# Patient Record
Sex: FEMALE | Race: WHITE | URBAN - METROPOLITAN AREA
[De-identification: names, ages, dates, MRNs, and addresses within clinical notes are randomized per-mention and may not be internally consistent; named-entity substitution may affect disease eponyms.]

---

## 2019-02-27 ENCOUNTER — APPOINTMENT (RX ONLY)
Dept: URBAN - METROPOLITAN AREA CLINIC 166 | Facility: CLINIC | Age: 79
Setting detail: DERMATOLOGY
End: 2019-02-27

## 2019-02-27 DIAGNOSIS — L57.8 OTHER SKIN CHANGES DUE TO CHRONIC EXPOSURE TO NONIONIZING RADIATION: ICD-10-CM

## 2019-02-27 DIAGNOSIS — Z41.9 ENCOUNTER FOR PROCEDURE FOR PURPOSES OTHER THAN REMEDYING HEALTH STATE, UNSPECIFIED: ICD-10-CM

## 2019-02-27 DIAGNOSIS — B00.1 HERPESVIRAL VESICULAR DERMATITIS: ICD-10-CM

## 2019-02-27 PROCEDURE — ? COSMETIC CONSULTATION: FILLERS

## 2019-02-27 PROCEDURE — ? TREATMENT REGIMEN

## 2019-02-27 PROCEDURE — ? PATIENT SPECIFIC COUNSELING

## 2019-02-27 PROCEDURE — ? PRESCRIPTION

## 2019-02-27 PROCEDURE — ? BOTOX

## 2019-02-27 PROCEDURE — ? COUNSELING

## 2019-02-27 RX ORDER — LIDOCAINE AND PRILOCAINE 25; 25 MG/G; MG/G
CREAM TOPICAL
Qty: 1 | Refills: 2 | Status: ERX | COMMUNITY
Start: 2019-02-27

## 2019-02-27 RX ORDER — ACYCLOVIR 800 MG/1
TABLET ORAL
Qty: 60 | Refills: 2 | Status: ERX | COMMUNITY
Start: 2019-02-27

## 2019-02-27 RX ADMIN — ACYCLOVIR: 800 TABLET ORAL at 00:00

## 2019-02-27 RX ADMIN — LIDOCAINE AND PRILOCAINE: 25; 25 CREAM TOPICAL at 00:00

## 2019-02-27 ASSESSMENT — LOCATION SIMPLE DESCRIPTION DERM
LOCATION SIMPLE: RIGHT CHEEK
LOCATION SIMPLE: LEFT CHEEK

## 2019-02-27 ASSESSMENT — LOCATION DETAILED DESCRIPTION DERM
LOCATION DETAILED: RIGHT CENTRAL MALAR CHEEK
LOCATION DETAILED: LEFT CENTRAL MALAR CHEEK

## 2019-02-27 ASSESSMENT — LOCATION ZONE DERM: LOCATION ZONE: FACE

## 2019-02-27 NOTE — PROCEDURE: BOTOX
Glabellar Complex Units: 0
Price (Use Numbers Only, No Special Characters Or $): 98
Dilution (U/0.1 Cc): 1
Additional Area 1 Units: 6.5
Post-Care Instructions: Patient instructed to not lie down for 4 hours and limit physical activity for 24 hours. Patient instructed not to travel by airplane for 48 hours.
Additional Area 1 Location: Face
Consent: Written consent obtained. Risks include but not limited to lid/brow ptosis, bruising, swelling, diplopia, temporary effect, incomplete chemical denervation.
Expiration Date (Month Year): 5/21
Detail Level: Zone
Lot #: [id number]

## 2019-02-27 NOTE — PROCEDURE: PATIENT SPECIFIC COUNSELING
Detail Level: Zone
Filler visit #1\\n1-2 Voluma , cheeks ($975 per)\\n1 Restylane Refine, lower face/lips/mouth corners  ($950)\\nTotal $2400- $1725 , price includes multiple syringe discount \\n\\nFiller visit #2 \\n1/2 Belotero , fine lines ($500) +/- Volbella ($550) $100 discount if both same day\\n\\nPre and post handout given \\nProduct handout given

## 2019-02-27 NOTE — PROCEDURE: TREATMENT REGIMEN
Detail Level: Simple
Initiate Treatment: Avene 0.1% cream , apply a pea sized drop to the face once a day as tolerated, start 2-3 days a week, increasing to nightly \\n.
Samples Given: Elta MD 40\\nNia Intensive Revovery\\n.
Discontinue Regimen: Zo products \\n.

## 2019-03-08 ENCOUNTER — APPOINTMENT (RX ONLY)
Dept: URBAN - METROPOLITAN AREA CLINIC 166 | Facility: CLINIC | Age: 79
Setting detail: DERMATOLOGY
End: 2019-03-08

## 2019-03-08 DIAGNOSIS — Z41.9 ENCOUNTER FOR PROCEDURE FOR PURPOSES OTHER THAN REMEDYING HEALTH STATE, UNSPECIFIED: ICD-10-CM

## 2019-03-08 PROCEDURE — ? FILLERS

## 2019-03-08 ASSESSMENT — LOCATION SIMPLE DESCRIPTION DERM: LOCATION SIMPLE: RIGHT CHEEK

## 2019-03-08 ASSESSMENT — LOCATION DETAILED DESCRIPTION DERM: LOCATION DETAILED: RIGHT CENTRAL MALAR CHEEK

## 2019-03-08 ASSESSMENT — LOCATION ZONE DERM: LOCATION ZONE: FACE

## 2019-03-29 ENCOUNTER — APPOINTMENT (RX ONLY)
Dept: URBAN - METROPOLITAN AREA CLINIC 166 | Facility: CLINIC | Age: 79
Setting detail: DERMATOLOGY
End: 2019-03-29

## 2019-03-29 DIAGNOSIS — H01.13 ECZEMATOUS DERMATITIS OF EYELID: ICD-10-CM

## 2019-03-29 DIAGNOSIS — Z41.9 ENCOUNTER FOR PROCEDURE FOR PURPOSES OTHER THAN REMEDYING HEALTH STATE, UNSPECIFIED: ICD-10-CM

## 2019-03-29 PROBLEM — H01.134 ECZEMATOUS DERMATITIS OF LEFT UPPER EYELID: Status: ACTIVE | Noted: 2019-03-29

## 2019-03-29 PROBLEM — H01.131 ECZEMATOUS DERMATITIS OF RIGHT UPPER EYELID: Status: ACTIVE | Noted: 2019-03-29

## 2019-03-29 PROCEDURE — ? TREATMENT REGIMEN

## 2019-03-29 PROCEDURE — ? COUNSELING

## 2019-03-29 PROCEDURE — ? FILLERS

## 2019-03-29 ASSESSMENT — LOCATION DETAILED DESCRIPTION DERM
LOCATION DETAILED: LEFT LATERAL SUPERIOR EYELID
LOCATION DETAILED: LEFT MEDIAL SUPERIOR EYELID
LOCATION DETAILED: RIGHT LATERAL SUPERIOR EYELID
LOCATION DETAILED: RIGHT MEDIAL SUPERIOR EYELID

## 2019-03-29 ASSESSMENT — LOCATION SIMPLE DESCRIPTION DERM
LOCATION SIMPLE: RIGHT SUPERIOR EYELID
LOCATION SIMPLE: LEFT SUPERIOR EYELID

## 2019-03-29 ASSESSMENT — LOCATION ZONE DERM: LOCATION ZONE: EYELID

## 2019-03-29 NOTE — PROCEDURE: FILLERS
Tear Troughs Filler  Volume In Cc: 0
Include Cannula Length?: 1.5 inch
Anesthesia Type: 1% lidocaine with epinephrine and a 1:10 solution of 8.4% sodium bicarbonate
Include Cannula Information In Note?: No
Anesthesia Volume In Cc: 0.3
Include Cannula Size?: 25G
Additional Area 1 Location: Face *[facility]
Additional Area 1 Location: Face
Additional Area 1 Volume In Cc: 0.5
Topical Anesthesia?: EMLA
Filler: Juvederm Voluma XC
Detail Level: Zone
Filler: Belotero
Lot #: [id number]
Price (Use Numbers Only, No Special Characters Or $): 900
Expiration Date (Month Year): 4/11/20
Include Cannula Information In Note?: Yes
Lot #: [id number]
Map Statment: See Attach Map for Complete Details
Consent: Written consent obtained. Risks include but not limited to bruising, beading, irregular texture, ulceration, infection, allergic reaction, scar formation, incomplete augmentation, temporary nature, procedural pain.
Post-Care Instructions: Patient instructed to apply ice to reduce swelling.
Expiration Date (Month Year): 2/12/20
Lot #: [id number] Mai Lawson M.D. SOLO***
Expiration Date (Month Year): 1/9/19

## 2019-03-29 NOTE — PROCEDURE: TREATMENT REGIMEN
Detail Level: Simple
Otc Regimen: Gentle cleanser \\n.
Samples Given: CeraVe cleanser\\nElidel Cream\\n.
Initiate Treatment: Elidel Cream as needed for rash and scaling \\n.
Samples Given: Elidel
Plan: Discontinue nail products and fragranced products, patient is to follow up with Deirdre Watkins M.D. for skin check\\nDiscussed future patch testing

## 2019-05-03 ENCOUNTER — APPOINTMENT (RX ONLY)
Dept: URBAN - METROPOLITAN AREA CLINIC 166 | Facility: CLINIC | Age: 79
Setting detail: DERMATOLOGY
End: 2019-05-03

## 2019-05-03 DIAGNOSIS — Z41.9 ENCOUNTER FOR PROCEDURE FOR PURPOSES OTHER THAN REMEDYING HEALTH STATE, UNSPECIFIED: ICD-10-CM

## 2019-05-03 DIAGNOSIS — L57.8 OTHER SKIN CHANGES DUE TO CHRONIC EXPOSURE TO NONIONIZING RADIATION: ICD-10-CM

## 2019-05-03 PROCEDURE — ? PRESCRIPTION

## 2019-05-03 PROCEDURE — ? BOTOX

## 2019-05-03 RX ORDER — PHARMACY COMPOUNDING ACCESSORY
EACH MISCELLANEOUS
Qty: 1 | Refills: 3 | Status: ERX | COMMUNITY
Start: 2019-05-03

## 2019-05-03 RX ADMIN — Medication: at 00:00

## 2019-05-03 NOTE — PROCEDURE: BOTOX
Lateral Platysmal Bands Units: 0
Dilution (U/0.1 Cc): 1
Expiration Date (Month Year): 9/21
Additional Area 1 Units: 32
Detail Level: Zone
Additional Area 6 Location: [facility]
Lot #: [id number]
Consent: Written consent obtained. Risks include but not limited to lid/brow ptosis, bruising, swelling, diplopia, temporary effect, incomplete chemical denervation.
Additional Area 1 Location: Face
Price (Use Numbers Only, No Special Characters Or $): 001
Post-Care Instructions: Patient instructed to not lie down for 4 hours and limit physical activity for 24 hours. Patient instructed not to travel by airplane for 48 hours.

## 2020-01-17 ENCOUNTER — APPOINTMENT (RX ONLY)
Dept: URBAN - METROPOLITAN AREA CLINIC 173 | Facility: CLINIC | Age: 80
Setting detail: DERMATOLOGY
End: 2020-01-17

## 2020-01-17 DIAGNOSIS — L57.8 OTHER SKIN CHANGES DUE TO CHRONIC EXPOSURE TO NONIONIZING RADIATION: ICD-10-CM

## 2020-01-17 DIAGNOSIS — Z41.9 ENCOUNTER FOR PROCEDURE FOR PURPOSES OTHER THAN REMEDYING HEALTH STATE, UNSPECIFIED: ICD-10-CM

## 2020-01-17 PROCEDURE — ? BOTOX

## 2020-01-17 PROCEDURE — ? OTHER (COSMETIC)

## 2020-01-17 PROCEDURE — ? PRESCRIPTION

## 2020-01-17 PROCEDURE — ? FILLERS

## 2020-01-17 NOTE — PROCEDURE: FILLERS
Lateral Face Filler  Volume In Cc: 0
Lot #: [id number]
Include Cannula Information In Note?: No
Expiration Date (Month Year): 3/29/21
Price (Use Numbers Only, No Special Characters Or $): 5945
Include Cannula Information In Note?: Yes
Expiration Date (Month Year): 8/19/20
Map Statment: See Attach Map for Complete Details
Include Cannula Size?: 25G
Lot #: [id number]
Anesthesia Type: 1% lidocaine with epinephrine and a 1:10 solution of 8.4% sodium bicarbonate
Include Cannula Length?: 1.5 inch
Expiration Date (Month Year): 08/31/2020
Additional Area 1 Location: Face
Consent: Written consent obtained. Risks include but not limited to bruising, beading, irregular texture, ulceration, infection, allergic reaction, scar formation, incomplete augmentation, temporary nature, procedural pain.
Topical Anesthesia?: EMLA
Post-Care Instructions: Patient instructed to apply ice to reduce swelling.
Anesthesia Volume In Cc: 0.6
Additional Area 1 Volume In Cc: 1
Filler: Juvederm Voluma XC
Filler: Juvederm Ultra XC
Detail Level: Zone
Lot #: [id number]

## 2020-01-17 NOTE — PROCEDURE: BOTOX
Show Levator Superior Units: Yes
Additional Area 3 Units: 0
Additional Area 1 Location: Face
Expiration Date (Month Year): 6/22
Price (Use Numbers Only, No Special Characters Or $): 310
Additional Area 1 Units: 42
Show Ucl Units: No
Consent: Written consent obtained. Risks include but not limited to lid/brow ptosis, bruising, swelling, diplopia, temporary effect, incomplete chemical denervation.
Lot #: [id number]
Dilution (U/0.1 Cc): 1
Post-Care Instructions: Patient instructed to not lie down for 4 hours and limit physical activity for 24 hours. Patient instructed not to travel by airplane for 48 hours.
Detail Level: Zone

## 2020-03-05 ENCOUNTER — RX ONLY (OUTPATIENT)
Age: 80
Setting detail: RX ONLY
End: 2020-03-05

## 2020-03-05 ENCOUNTER — APPOINTMENT (RX ONLY)
Dept: URBAN - METROPOLITAN AREA CLINIC 173 | Facility: CLINIC | Age: 80
Setting detail: DERMATOLOGY
End: 2020-03-05

## 2020-03-05 DIAGNOSIS — Z41.9 ENCOUNTER FOR PROCEDURE FOR PURPOSES OTHER THAN REMEDYING HEALTH STATE, UNSPECIFIED: ICD-10-CM

## 2020-03-05 PROCEDURE — ? SECRET RF

## 2020-03-05 ASSESSMENT — LOCATION SIMPLE DESCRIPTION DERM
LOCATION SIMPLE: RIGHT CHEEK
LOCATION SIMPLE: LEFT ANTERIOR NECK

## 2020-03-05 ASSESSMENT — LOCATION DETAILED DESCRIPTION DERM
LOCATION DETAILED: RIGHT CENTRAL MALAR CHEEK
LOCATION DETAILED: LEFT SUPERIOR LATERAL NECK

## 2020-03-05 ASSESSMENT — LOCATION ZONE DERM
LOCATION ZONE: FACE
LOCATION ZONE: NECK

## 2020-03-05 NOTE — PROCEDURE: SECRET RF
Passes: 0
Depth In Microns: 0.5
Additional Treatment Area: neck
Tip: 24-Insulated
Treatment Number: 1
Tip: 64-Insulated
Treatment Area: periorbital
Intensity (Include Units If Applicable): 5
Passes: 2
Additional Treatment Area: mid-lower face
Additional Treatment Area: forehead
Detail Level: Zone
Render Post-Care In The Note: Yes
Consent: Written consent obtained, risks reviewed including but not limited to darker or lighter pigmentary change, and/or incomplete improvement.
Rf Duration (Include Units If Applicable): 300ms
Intensity (Include Units If Applicable): 6
Use Distraction Techniques In Note: No
Rf Duration (Include Units If Applicable): 300
Post-Care Instructions: I reviewed with the patient in detail post-care instructions. Patient should apply moisturizer until fully healed.
Rf Duration (Include Units If Applicable): 200ms
Passes: 3
Additional Treatment Area: upper face
Indication: Wrinkles

## 2021-06-09 NOTE — PROCEDURE: FILLERS
Additional Area 1 Volume In Cc: 0
Expiration Date (Month Year): 2/28/20
Include Cannula Information In Note?: Yes
Anesthesia Type: 1% lidocaine with epinephrine and a 1:10 solution of 8.4% sodium bicarbonate
Anesthesia Volume In Cc: 1
Include Cannula Size?: 25G
Include Cannula Length?: 1.5 inch
Include Cannula Information In Note?: No
Lot #: [id number]
Additional Area 1 Location: Face
Topical Anesthesia?: EMLA
Lot #: [id number] Mai Lawson M.D. SOLO***
Expiration Date (Month Year): 1/9/19
Detail Level: Zone
Filler: Restylane Refyne
Price (Use Numbers Only, No Special Characters Or $): 7849
Filler: Juvederm Voluma XC
Lot #: [id number]
Consent: Written consent obtained. Risks include but not limited to bruising, beading, irregular texture, ulceration, infection, allergic reaction, scar formation, incomplete augmentation, temporary nature, procedural pain.
Map Statment: See Attach Map for Complete Details
There is 1 Wet Read(s) to document.
Post-Care Instructions: Patient instructed to apply ice to reduce swelling.

## 2022-03-16 ENCOUNTER — APPOINTMENT (RX ONLY)
Dept: URBAN - METROPOLITAN AREA CLINIC 173 | Facility: CLINIC | Age: 82
Setting detail: DERMATOLOGY
End: 2022-03-16

## 2022-03-16 ENCOUNTER — RX ONLY (OUTPATIENT)
Age: 82
Setting detail: RX ONLY
End: 2022-03-16

## 2022-03-16 DIAGNOSIS — B00.1 HERPESVIRAL VESICULAR DERMATITIS: ICD-10-CM

## 2022-03-16 DIAGNOSIS — Z41.9 ENCOUNTER FOR PROCEDURE FOR PURPOSES OTHER THAN REMEDYING HEALTH STATE, UNSPECIFIED: ICD-10-CM

## 2022-03-16 PROCEDURE — ? OTHER (COSMETIC)

## 2022-03-16 PROCEDURE — ? BOTOX

## 2022-03-16 PROCEDURE — ? FILLERS

## 2022-03-16 PROCEDURE — ? PRESCRIPTION

## 2022-03-16 RX ORDER — LIDOCAINE AND PRILOCAINE 25; 25 MG/G; MG/G
CREAM TOPICAL
Qty: 30 | Refills: 3 | Status: ERX

## 2022-03-16 RX ORDER — ACYCLOVIR 800 MG/1
TABLET ORAL
Qty: 60 | Refills: 1 | Status: CANCELLED

## 2022-03-16 ASSESSMENT — LOCATION SIMPLE DESCRIPTION DERM: LOCATION SIMPLE: LEFT CHEEK

## 2022-03-16 ASSESSMENT — LOCATION DETAILED DESCRIPTION DERM: LOCATION DETAILED: LEFT CENTRAL MALAR CHEEK

## 2022-03-16 ASSESSMENT — LOCATION ZONE DERM: LOCATION ZONE: FACE

## 2022-03-16 NOTE — PROCEDURE: BOTOX
Glabellar Complex Units: 0
Consent: Written consent obtained. Risks include but not limited to lid/brow ptosis, bruising, swelling, diplopia, temporary effect, incomplete chemical denervation.
Show Additional Area 6: Yes
Show Ucl Units: No
Additional Area 1 Location: Face
Post-Care Instructions: Patient instructed to not lie down for 4 hours and limit physical activity for 24 hours. Patient instructed not to travel by airplane for 48 hours.
Additional Area 2 Location: Upper cutaneous lip
Detail Level: Zone
Additional Area 1 Units: 41
Lot #: [id number]
Additional Area 3 Location: masseters
Price (Use Numbers Only, No Special Characters Or $): 042
Expiration Date (Month Year): 5/24
Dilution (U/0.1 Cc): 1

## 2022-03-16 NOTE — PROCEDURE: OTHER (COSMETIC)
Detail Level: Zone
Other (Free Text): Discussed Radiofrequency Micro-needling (Vivace) to improve the texture and fine lines around lower face perioral area. Radiofrequency Microneedling is aimed at stimulating the body's own collagen production to tighten, lift and rejuvenate the skin. It can also reduce the appearance of fine lines and wrinkles, minimize pores, stretch marks and scarring.\\n\\n$800/treatment for full face. $400/treatment for focused areas. Handout provided and reviewed.

## 2022-03-16 NOTE — PROCEDURE: FILLERS
Additional Area 2 Volume In Cc: 0
Include Cannula Information In Note?: No
Filler: RHA 3
Include Cannula Information In Note?: Yes
Include Cannula Size?: 25G
Vermilion Lips Filler Volume In Cc: 0.5
Include Cannula Length?: 1.5 inch
Anesthesia Type: 1% lidocaine with epinephrine and a 1:10 solution of 8.4% sodium bicarbonate
Additional Area 1 Location: face
Anesthesia Volume In Cc: 0.3
Lot #: [id number]
Expiration Date (Month Year): 7/12/24
Topical Anesthesia?: 2.5% lidocaine, 2.5% prilocaine
Consent: Written consent obtained. Risks include but not limited to bruising, beading, irregular texture, ulceration, infection, allergic reaction, scar formation, incomplete augmentation, temporary nature, procedural pain.
Detail Level: Zone
Additional Area 2 Location: Hands
Post-Care Instructions: Patient instructed to apply ice to reduce swelling.
Filler: RHA 2
Price (Use Numbers Only, No Special Characters Or $): 1195
Additional Area 3 Location: chin
Lot #: [id number]
Lot #: [id number]
Additional Area 1 Volume In Cc: 1
Map Statment: See Attach Map for Complete Details
Additional Area 2 Location: ear lobes
Expiration Date (Month Year): 8/23/24
Expiration Date (Month Year): 08/31/2020

## 2022-04-01 ENCOUNTER — APPOINTMENT (RX ONLY)
Dept: URBAN - METROPOLITAN AREA CLINIC 173 | Facility: CLINIC | Age: 82
Setting detail: DERMATOLOGY
End: 2022-04-01

## 2022-04-01 DIAGNOSIS — Z41.9 ENCOUNTER FOR PROCEDURE FOR PURPOSES OTHER THAN REMEDYING HEALTH STATE, UNSPECIFIED: ICD-10-CM

## 2022-04-01 PROCEDURE — ? TREATMENT REGIMEN

## 2022-04-01 PROCEDURE — ? OTHER (COSMETIC)

## 2022-04-01 NOTE — PROCEDURE: OTHER (COSMETIC)
Detail Level: Simple
Other (Free Text): Discussed Vivace Beth-Oral Plus quoted at $400 x 5 treatments 4-6 weeks apart.\\n* patient advised to start the Alastin Nector 18 days prior BID and 1 week post.\\n- Patient given a pre and post handout .
Other (Free Text): Patient is happy with Botox will return in December for Filler and Botox at that time.

## 2022-04-05 ENCOUNTER — RX ONLY (OUTPATIENT)
Age: 82
Setting detail: RX ONLY
End: 2022-04-05

## 2022-04-05 RX ORDER — PHARMACY COMPOUNDING ACCESSORY
EACH MISCELLANEOUS
Qty: 30 | Refills: 3 | Status: ERX | COMMUNITY
Start: 2022-04-05

## 2022-04-27 ENCOUNTER — APPOINTMENT (RX ONLY)
Dept: URBAN - METROPOLITAN AREA CLINIC 173 | Facility: CLINIC | Age: 82
Setting detail: DERMATOLOGY
End: 2022-04-27

## 2022-04-27 DIAGNOSIS — Z41.9 ENCOUNTER FOR PROCEDURE FOR PURPOSES OTHER THAN REMEDYING HEALTH STATE, UNSPECIFIED: ICD-10-CM

## 2022-04-27 PROCEDURE — ? VIVACE

## 2022-04-27 ASSESSMENT — LOCATION SIMPLE DESCRIPTION DERM: LOCATION SIMPLE: RIGHT CHEEK

## 2022-04-27 ASSESSMENT — LOCATION ZONE DERM: LOCATION ZONE: FACE

## 2022-04-27 ASSESSMENT — LOCATION DETAILED DESCRIPTION DERM: LOCATION DETAILED: RIGHT CENTRAL MALAR CHEEK

## 2022-04-27 NOTE — PROCEDURE: VIVACE
Depth In Mm: 1.5
Pre-Procedure Text: After consent was obtained the treatment areas were treated using the above parameters.
Treatment Number (Optional): 1
Rf (Optional): 5/500,4/600,4/600
Location #1: perioral
Detail Level: Zone
Post-Care Instructions: After the procedure, take precautions agains sun exposure. Do not apply sunscreen for 12 hours after the procedure. Do not apply make-up for 12 hours after the procedure. Avoid alcohol based toners for 10-14 days. After 2-3 days patients can return to their regular skin regimen.
Depth In Mm: 0.5
Price (Use Numbers Only, No Special Characters Or $): 813
Depth In Mm: 1.3
Consent: Written consent obtained, risks reviewed including but not limited to pain, scarring, infection and incomplete improvement.  Patient understands the procedure is cosmetic in nature and will require out of pocket payment.

## 2022-04-29 ENCOUNTER — RX ONLY (OUTPATIENT)
Age: 82
Setting detail: RX ONLY
End: 2022-04-29

## 2022-04-29 RX ORDER — ACYCLOVIR 800 MG/1
TABLET ORAL
Qty: 60 | Refills: 1 | Status: ERX

## 2023-04-10 ENCOUNTER — RX ONLY (OUTPATIENT)
Age: 83
Setting detail: RX ONLY
End: 2023-04-10

## 2023-04-10 ENCOUNTER — APPOINTMENT (RX ONLY)
Dept: URBAN - METROPOLITAN AREA CLINIC 173 | Facility: CLINIC | Age: 83
Setting detail: DERMATOLOGY
End: 2023-04-10

## 2023-04-10 DIAGNOSIS — Z41.9 ENCOUNTER FOR PROCEDURE FOR PURPOSES OTHER THAN REMEDYING HEALTH STATE, UNSPECIFIED: ICD-10-CM

## 2023-04-10 DIAGNOSIS — L57.8 OTHER SKIN CHANGES DUE TO CHRONIC EXPOSURE TO NONIONIZING RADIATION: ICD-10-CM

## 2023-04-10 PROCEDURE — ? OTHER (COSMETIC)

## 2023-04-10 PROCEDURE — ? PRESCRIPTION

## 2023-04-10 PROCEDURE — ? FILLERS

## 2023-04-10 PROCEDURE — ? BOTOX

## 2023-04-10 RX ORDER — PHARMACY COMPOUNDING ACCESSORY
EACH MISCELLANEOUS
Qty: 30 | Refills: 3 | Status: ERX

## 2023-04-10 RX ORDER — LIDOCAINE AND PRILOCAINE 25; 25 MG/G; MG/G
CREAM TOPICAL
Qty: 30 | Refills: 3 | Status: ERX

## 2023-04-10 NOTE — PROCEDURE: BOTOX
Glabellar Complex Units: 0
Show Periorbital Units: Yes
Price (Use Numbers Only, No Special Characters Or $): 537
Additional Area 2 Location: Upper cutaneous lip
Show Ucl Units: No
Consent: Written consent obtained. Risks include but not limited to lid/brow ptosis, bruising, swelling, diplopia, temporary effect, incomplete chemical denervation.
Post-Care Instructions: Patient instructed to not lie down for 4 hours and limit physical activity for 24 hours. Patient instructed not to travel by airplane for 48 hours.
Lot #: [id number]
Additional Area 1 Location: Face
Dilution (U/0.1 Cc): 1
Additional Area 1 Units: 43
Incrementing Botox Units: By 0.5 Units
Additional Area 3 Location: masseters
Detail Level: Zone
Expiration Date (Month Year): 10/25

## 2023-04-10 NOTE — PROCEDURE: OTHER (COSMETIC)
Other (Free Text): Discussed the use of Hydrocolloid bandages at bedtime to smoothen skin on areas that are showing increased wrinkle depth after sleeping.
Detail Level: Zone

## 2023-04-10 NOTE — PROCEDURE: FILLERS
Aspiration Statement: Aspiration was performed prior to injecting site with filler.
Cheeks Filler Volume In Cc: 0
Additional Area 1 Location: face
Consent: Written consent obtained. Risks include but not limited to bruising, beading, irregular texture, ulceration, infection, allergic reaction, scar formation, incomplete augmentation, temporary nature, procedural pain.
Lot #: [id number]
Post-Care Instructions: Patient instructed to apply ice to reduce swelling.
Include Cannula Size?: 25G
Vermilion Lips Filler Volume In Cc: 0.3
Filler: Restylane Contour
Expiration Date (Month Year): 11/12/25
Anesthesia Type: 2% lidocaine with epinephrine and a 1:10 solution of 8.4% sodium bicarbonate
Include Cannula Information In Note?: Yes
Lot #: [id number]
Include Cannula Length?: 1.5 inch
Cheeks Filler Volume In Cc: 1
Expiration Date (Month Year): 6/21/25
Include Cannula Information In Note?: No
Topical Anesthesia?: 2.5% lidocaine, 2.5% prilocaine
Detail Level: Zone
Additional Area 2 Location: Hands
Price (Use Numbers Only, No Special Characters Or $): 9811
Additional Area 1 Volume In Cc: 0.7
Filler: RHA Redensity
Additional Area 3 Location: chin
Additional Area 2 Location: ear lobes
Lot #: [id number]
Map Statment: See Attach Map for Complete Details
Filler: RHA 2
Expiration Date (Month Year): 10/31/23

## 2024-01-16 ENCOUNTER — OFFICE VISIT (OUTPATIENT)
Facility: LOCATION | Age: 84
End: 2024-01-16
Payer: MEDICARE

## 2024-01-16 DIAGNOSIS — H52.13 MYOPIA, BILATERAL: ICD-10-CM

## 2024-01-16 DIAGNOSIS — Z96.1 PRESENCE OF INTRAOCULAR LENS: ICD-10-CM

## 2024-01-16 DIAGNOSIS — H40.013 OPEN ANGLE WITH BORDERLINE FINDINGS, LOW RISK, BILATERAL: Primary | ICD-10-CM

## 2024-01-16 PROCEDURE — 92014 COMPRE OPH EXAM EST PT 1/>: CPT | Performed by: OPHTHALMOLOGY

## 2024-01-16 PROCEDURE — 92133 CPTRZD OPH DX IMG PST SGM ON: CPT | Performed by: OPHTHALMOLOGY

## 2024-01-16 ASSESSMENT — INTRAOCULAR PRESSURE
OS: 16
OD: 16

## 2024-02-12 ENCOUNTER — RX ONLY (OUTPATIENT)
Age: 84
Setting detail: RX ONLY
End: 2024-02-12

## 2024-02-12 RX ORDER — LIDOCAINE AND PRILOCAINE 25; 25 MG/G; MG/G
CREAM TOPICAL
Qty: 30 | Refills: 3 | Status: ERX

## 2024-02-14 ENCOUNTER — APPOINTMENT (RX ONLY)
Dept: URBAN - METROPOLITAN AREA CLINIC 173 | Facility: CLINIC | Age: 84
Setting detail: DERMATOLOGY
End: 2024-02-14

## 2024-02-14 DIAGNOSIS — L98.8 OTHER SPECIFIED DISORDERS OF THE SKIN AND SUBCUTANEOUS TISSUE: ICD-10-CM

## 2024-02-14 DIAGNOSIS — Z41.9 ENCOUNTER FOR PROCEDURE FOR PURPOSES OTHER THAN REMEDYING HEALTH STATE, UNSPECIFIED: ICD-10-CM

## 2024-02-14 PROCEDURE — ? FILLERS

## 2024-02-14 PROCEDURE — ? COUNSELING

## 2024-02-14 PROCEDURE — ? BOTOX

## 2024-02-14 ASSESSMENT — LOCATION DETAILED DESCRIPTION DERM: LOCATION DETAILED: RIGHT INFERIOR VERMILION LIP

## 2024-02-14 ASSESSMENT — LOCATION ZONE DERM: LOCATION ZONE: LIP

## 2024-02-14 ASSESSMENT — LOCATION SIMPLE DESCRIPTION DERM: LOCATION SIMPLE: RIGHT LIP

## 2024-02-14 NOTE — PROCEDURE: BOTOX
Mentalis Units: 0
Additional Area 3 Location: masseters
Expiration Date (Month Year): 4/26
Show Nasal Units: Yes
Price (Use Numbers Only, No Special Characters Or $): 538
Show Ucl Units: No
Incrementing Botox Units: By 0.5 Units
Additional Area 2 Location: Upper cutaneous lip
Consent: Written consent obtained. Risks include but not limited to lid/brow ptosis, bruising, swelling, diplopia, temporary effect, incomplete chemical denervation.
Post-Care Instructions: Patient instructed to not lie down for 4 hours and limit physical activity for 24 hours. Patient instructed not to travel by airplane for 48 hours.
Lot #: [id number]
Detail Level: Zone
Additional Area 1 Location: Face
Dilution (U/0.1 Cc): 1
Additional Area 1 Units: 43

## 2024-02-14 NOTE — PROCEDURE: FILLERS
Decollete Filler Volume In Cc: 0
Additional Area 1 Location: face
Include Cannula Information In Note?: No
Expiration Date (Month Year): 1/31/25
Include Cannula Length?: 1.5 inch
Include Cannula Size?: 25G
Additional Area 1 Volume In Cc: 0.7
Detail Level: Zone
Topical Anesthesia?: 2.5% lidocaine, 2.5% prilocaine
Lot #: [id number]
Additional Area 2 Location: Hands
Filler: RHA 2
Expiration Date (Month Year): 03/13/26
Price (Use Numbers Only, No Special Characters Or $): 8017
Additional Area 3 Location: chin
Include Cannula Information In Note?: Yes
Filler: Restylane Contour
Map Statment: See Attach Map for Complete Details
Cheeks Filler Volume In Cc: 1
Vermilion Lips Filler Volume In Cc: 0.3
Lot #: [id number]
Aspiration Statement: Aspiration was performed prior to injecting site with filler.
Expiration Date (Month Year): 3/20/26
Consent: Written consent obtained. Risks include but not limited to bruising, beading, irregular texture, ulceration, infection, allergic reaction, scar formation, incomplete augmentation, temporary nature, procedural pain.
Additional Area 2 Location: ear lobes
Post-Care Instructions: Patient instructed to apply ice to reduce swelling.
Anesthesia Type: 1% lidocaine with epinephrine and a 1:10 solution of 8.4% sodium bicarbonate
Filler: RHA 3
Lot #: [id number]
Anesthesia Volume In Cc: 0.5

## 2024-08-29 ENCOUNTER — TECH ONLY (OUTPATIENT)
Facility: LOCATION | Age: 84
End: 2024-08-29
Payer: COMMERCIAL

## 2024-08-29 DIAGNOSIS — H40.013 OPEN ANGLE WITH BORDERLINE FINDINGS, LOW RISK, BILATERAL: Primary | ICD-10-CM

## 2024-09-05 ENCOUNTER — OFFICE VISIT (OUTPATIENT)
Facility: LOCATION | Age: 84
End: 2024-09-05
Payer: COMMERCIAL

## 2024-09-05 DIAGNOSIS — Z96.1 PRESENCE OF INTRAOCULAR LENS: ICD-10-CM

## 2024-09-05 DIAGNOSIS — H40.013 OPEN ANGLE WITH BORDERLINE FINDINGS, LOW RISK, BILATERAL: Primary | ICD-10-CM

## 2024-09-05 PROCEDURE — 92250 FUNDUS PHOTOGRAPHY W/I&R: CPT | Performed by: OPHTHALMOLOGY

## 2024-09-05 PROCEDURE — 92012 INTRM OPH EXAM EST PATIENT: CPT | Performed by: OPHTHALMOLOGY

## 2024-09-05 ASSESSMENT — INTRAOCULAR PRESSURE
OD: 18
OS: 18

## 2025-01-08 ENCOUNTER — APPOINTMENT (OUTPATIENT)
Dept: URBAN - METROPOLITAN AREA CLINIC 173 | Facility: CLINIC | Age: 85
Setting detail: DERMATOLOGY
End: 2025-01-08

## 2025-01-08 DIAGNOSIS — Z41.9 ENCOUNTER FOR PROCEDURE FOR PURPOSES OTHER THAN REMEDYING HEALTH STATE, UNSPECIFIED: ICD-10-CM

## 2025-01-08 PROCEDURE — ? FILLERS

## 2025-01-08 PROCEDURE — ? OTHER

## 2025-01-08 PROCEDURE — ? OTHER (COSMETIC)

## 2025-01-08 PROCEDURE — ? BOTOX

## 2025-01-08 NOTE — PROCEDURE: BOTOX
Additional Area 5 Units: 0
Dilution (U/0.1 Cc): 1
Show Glabellar Units: Yes
Show Mentalis Units: No
Incrementing Botox Units: By 0.5 Units
Additional Area 2 Location: Upper cutaneous lip
Detail Level: Zone
Additional Area 3 Location: masseters
Additional Area 1 Location: Face
Expiration Date (Month Year): 3/27
Consent: Written consent obtained. Risks include but not limited to lid/brow ptosis, bruising, swelling, diplopia, temporary effect, incomplete chemical denervation.
Additional Area 1 Units: 42
Price (Use Numbers Only, No Special Characters Or $): 131
Post-Care Instructions: Patient instructed to not lie down for 4 hours and limit physical activity for 24 hours. Patient instructed not to travel by airplane for 48 hours.
Lot #: [id number]

## 2025-01-08 NOTE — PROCEDURE: OTHER (COSMETIC)
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 I recommend she discuss this with her dentist.\\n\\nAlso discussed adding Alto and Silk Shield in the am and continuing with AlpfaRet in the evening. She is to resume Triple Liped once she runs out of her [place] Cream.\\n\\nAlso discussed adding Besha Collagen to coffee in the AM.\\n\\nPatient has a difficult time tolerating prescription tretinoin, so change nighttime regiment to AlphaRet followed by a moisturizer.
Detail Level: Zone

## 2025-01-08 NOTE — PROCEDURE: OTHER
Render Risk Assessment In Note?: no
Detail Level: Zone
Other (Free Text): Patient declines oral medication for HSV prophylaxis
Note Text (......Xxx Chief Complaint.): This diagnosis correlates with the

## 2025-01-08 NOTE — PROCEDURE: FILLERS
Vermilion Lips Filler Volume In Cc: 0
Include Cannula Size?: 25G
Detail Level: Zone
Include Cannula Information In Note?: No
Additional Area 2 Location: Hands
Include Cannula Length?: 1.5 inch
Price (Use Numbers Only, No Special Characters Or $): 2000
Filler: RHA 2
Additional Area 3 Location: chin
Include Cannula Information In Note?: Yes
Map Statment: See Attach Map for Complete Details
Expiration Date (Month Year): 2/13/27
Filler: RHA Redensity
Lot #: [id number]
Vermilion Lips Filler Volume In Cc: 0.2
Aspiration Statement: Aspiration was performed prior to injecting site with filler.
Lot #: [id number]
Additional Area 1 Volume In Cc: 0.4
Additional Area 1 Location: face
Expiration Date (Month Year): 3/2/27
Anesthesia Type: 2% lidocaine with epinephrine and a 1:12 solution of 8.4% sodium bicarbonate
Anesthesia Volume In Cc: 0.3
Filler: RHA 3
Expiration Date (Month Year): 6/23/26
Additional Area 2 Location: ear lobes
Additional Area 1 Volume In Cc: 1
Consent: Written consent obtained. Risks include but not limited to bruising, beading, irregular texture, ulceration, infection, allergic reaction, scar formation, incomplete augmentation, temporary nature, procedural pain.
Topical Anesthesia?: 2.5% lidocaine, 2.5% prilocaine
Lot #: [id number] *[facility]*
Post-Care Instructions: Patient instructed to apply ice to reduce swelling.
Additional Area 1 Volume In Cc: 0.8

## 2025-05-09 ENCOUNTER — APPOINTMENT (OUTPATIENT)
Dept: URBAN - METROPOLITAN AREA CLINIC 173 | Facility: CLINIC | Age: 85
Setting detail: DERMATOLOGY
End: 2025-05-09

## 2025-05-09 DIAGNOSIS — Z41.9 ENCOUNTER FOR PROCEDURE FOR PURPOSES OTHER THAN REMEDYING HEALTH STATE, UNSPECIFIED: ICD-10-CM

## 2025-05-09 PROCEDURE — ? BOTOX

## 2025-05-09 NOTE — PROCEDURE: BOTOX
Incrementing Botox Units: By 0.5 Units
Show Lateral Platysmal Band Units: Yes
R Brow Units: 0
Additional Area 1 Location: Face
Dilution (U/0.1 Cc): 1
Additional Area 1 Units: 42
Show Ucl Units: No
Additional Area 3 Location: masseters
Detail Level: Zone
Expiration Date (Month Year): 6/27
Price (Use Numbers Only, No Special Characters Or $): 453
Additional Area 2 Location: Upper cutaneous lip
Consent: Written consent obtained. Risks include but not limited to lid/brow ptosis, bruising, swelling, diplopia, temporary effect, incomplete chemical denervation.
Post-Care Instructions: Patient instructed to not lie down for 4 hours and limit physical activity for 24 hours. Patient instructed not to travel by airplane for 48 hours.
Lot #: [id number]